# Patient Record
Sex: MALE | Race: WHITE
[De-identification: names, ages, dates, MRNs, and addresses within clinical notes are randomized per-mention and may not be internally consistent; named-entity substitution may affect disease eponyms.]

---

## 2017-04-04 ENCOUNTER — HOSPITAL ENCOUNTER (EMERGENCY)
Dept: HOSPITAL 53 - M ED | Age: 32
Discharge: HOME | End: 2017-04-04
Payer: COMMERCIAL

## 2017-04-04 VITALS — WEIGHT: 250 LBS | BODY MASS INDEX: 33.13 KG/M2 | HEIGHT: 73 IN

## 2017-04-04 VITALS — DIASTOLIC BLOOD PRESSURE: 79 MMHG | SYSTOLIC BLOOD PRESSURE: 140 MMHG
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
## 2017-04-04 DIAGNOSIS — F17.210: ICD-10-CM
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
## 2017-04-04 DIAGNOSIS — S80.861A: Primary | ICD-10-CM

## 2017-06-21 ENCOUNTER — HOSPITAL ENCOUNTER (EMERGENCY)
Dept: HOSPITAL 53 - M ED | Age: 32
Discharge: HOME | End: 2017-06-21
Payer: COMMERCIAL

## 2017-06-21 VITALS — HEIGHT: 72 IN | WEIGHT: 299.83 LBS | BODY MASS INDEX: 40.61 KG/M2

## 2017-06-21 VITALS — DIASTOLIC BLOOD PRESSURE: 70 MMHG | SYSTOLIC BLOOD PRESSURE: 132 MMHG
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
## 2017-06-21 DIAGNOSIS — S39.012A: Primary | ICD-10-CM
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
## 2017-06-21 NOTE — REP
LUMBOSACRAL SPINE:

 

Five views of the lumbosacral spine performed.

 

There is no fracture or dislocation. Vertebral bodies are normal in height and

well aligned with normal lumbar lordosis. There is no spondylolysis or

spondylolisthesis. Disc spaces are well preserved. Posterior elements are intact.

 

 

IMPRESSION:

 

Negative lumbosacral spine series.

 

 

Signed by

Gabe Cabrera MD 06/22/2017 05:11 P

## 2017-07-26 ENCOUNTER — HOSPITAL ENCOUNTER (OUTPATIENT)
Dept: HOSPITAL 53 - M OUTALCOH | Age: 32
End: 2017-07-26
Attending: PSYCHIATRY & NEUROLOGY
Payer: MEDICAID

## 2017-07-26 DIAGNOSIS — Z03.89: Primary | ICD-10-CM

## 2017-08-01 ENCOUNTER — HOSPITAL ENCOUNTER (OUTPATIENT)
Dept: HOSPITAL 53 - M RAD | Age: 32
End: 2017-08-01
Attending: FAMILY MEDICINE
Payer: COMMERCIAL

## 2017-08-01 DIAGNOSIS — M51.26: Primary | ICD-10-CM

## 2017-08-01 DIAGNOSIS — M51.27: ICD-10-CM

## 2017-08-02 NOTE — REP
MRI LUMBAR SPINE WITHOUT CONTRAST:

 

HISTORY:  Back pain.

 

Decreased signal intensity on T2 weighted images is present in the L3-4 through

L5-S1 intervertebral discs.  The discs are decreased in height.  These findings

are consistent with disc degeneration.

 

There is no disc bulge or herniation at the L1-2 and L2-3 levels.  The nerves

exit the neural foramina without compression.

 

A diffuse disc bulge is present at the L3-4 level.  There is minimal compression

of the thecal sac.  The L3 nerves exit the neural foramina without compression.

 

 

A diffuse disc bugle and small central disc protrusion are present at the L4-5

level.  There is minimal compression of the thecal sac.  The L4 nerves exit the

neural foramina without compression.

 

A diffuse disc bulge and small disc protrusion central and eccentric to the left

are present at the L5-S1 level.  The disc protrusion abuts the left S1 nerve.

There is no thecal sac compression.  The L5 nerves exit the neural foramina

without compression.

 

The conus medullaris is normal in appearance terminating at the level of the

T12-L1 intervertebral disc.  Normal signal intensity is present in the lumbar

vertebral bodies.  A hemangioma is present in the S1 vertebral body.

 

IMPRESSION:

 

1.  Diffuse disc bugle and the L3-4 level with minimal thecal sac compression.

 

2.  Diffuse disc bugle and small central disc protrusion at the L4-5 level with

minimal thecal sac compression.

 

3.  Diffuse disc bugle and small disc protrusion at the L5-S1 level.  The disc

protrusion abuts the left S1 nerve.

 

 

Signed by

Chapo Murray MD 08/02/2017 08:44 A

## 2017-09-12 ENCOUNTER — HOSPITAL ENCOUNTER (EMERGENCY)
Dept: HOSPITAL 53 - M ED | Age: 32
Discharge: HOME | End: 2017-09-12
Payer: COMMERCIAL

## 2017-09-12 VITALS — WEIGHT: 302.92 LBS | BODY MASS INDEX: 41.03 KG/M2 | HEIGHT: 72 IN

## 2017-09-12 VITALS — SYSTOLIC BLOOD PRESSURE: 137 MMHG | DIASTOLIC BLOOD PRESSURE: 77 MMHG

## 2017-09-12 DIAGNOSIS — F17.200: ICD-10-CM

## 2017-09-12 DIAGNOSIS — Z79.899: ICD-10-CM

## 2017-09-12 DIAGNOSIS — M54.5: Primary | ICD-10-CM

## 2017-09-12 DIAGNOSIS — G89.29: ICD-10-CM

## 2017-09-12 PROCEDURE — 99282 EMERGENCY DEPT VISIT SF MDM: CPT

## 2017-09-12 PROCEDURE — 96372 THER/PROPH/DIAG INJ SC/IM: CPT

## 2017-09-18 ENCOUNTER — HOSPITAL ENCOUNTER (OUTPATIENT)
Dept: HOSPITAL 53 - M PT | Age: 32
LOS: 12 days | End: 2017-09-30
Attending: STUDENT IN AN ORGANIZED HEALTH CARE EDUCATION/TRAINING PROGRAM
Payer: COMMERCIAL

## 2017-09-18 DIAGNOSIS — G89.29: ICD-10-CM

## 2017-09-18 DIAGNOSIS — M54.40: ICD-10-CM

## 2017-09-18 DIAGNOSIS — Z51.89: Primary | ICD-10-CM

## 2017-10-06 ENCOUNTER — HOSPITAL ENCOUNTER (OUTPATIENT)
Dept: HOSPITAL 53 - M PT | Age: 32
LOS: 25 days | End: 2017-10-31
Attending: STUDENT IN AN ORGANIZED HEALTH CARE EDUCATION/TRAINING PROGRAM
Payer: COMMERCIAL

## 2017-10-06 DIAGNOSIS — M54.40: ICD-10-CM

## 2017-10-06 DIAGNOSIS — Z51.89: Primary | ICD-10-CM

## 2017-12-05 ENCOUNTER — HOSPITAL ENCOUNTER (OUTPATIENT)
Dept: HOSPITAL 53 - M SFHCPLAZ | Age: 32
End: 2017-12-05
Attending: FAMILY MEDICINE
Payer: COMMERCIAL

## 2017-12-05 DIAGNOSIS — Z83.49: ICD-10-CM

## 2017-12-05 DIAGNOSIS — E66.01: Primary | ICD-10-CM

## 2017-12-05 LAB
ALBUMIN SERPL BCG-MCNC: 4 GM/DL (ref 3.2–5.2)
ALBUMIN/GLOB SERPL: 1.14 {RATIO} (ref 1–1.93)
ALP SERPL-CCNC: 81 U/L (ref 45–117)
ALT SERPL W P-5'-P-CCNC: 28 U/L (ref 12–78)
ANION GAP SERPL CALC-SCNC: 6 MEQ/L (ref 8–16)
AST SERPL-CCNC: 14 U/L (ref 7–37)
BILIRUB SERPL-MCNC: 0.4 MG/DL (ref 0.2–1)
BUN SERPL-MCNC: 14 MG/DL (ref 7–18)
CALCIUM SERPL-MCNC: 9 MG/DL (ref 8.5–10.1)
CHLORIDE SERPL-SCNC: 105 MEQ/L (ref 98–107)
CHOLEST SERPL-MCNC: 182 MG/DL (ref ?–200)
CO2 SERPL-SCNC: 30 MEQ/L (ref 21–32)
CREAT SERPL-MCNC: 0.97 MG/DL (ref 0.7–1.3)
EST. AVERAGE GLUCOSE BLD GHB EST-MCNC: 105 MG/DL (ref 60–110)
GFR SERPL CREATININE-BSD FRML MDRD: > 60 ML/MIN/{1.73_M2} (ref 60–?)
GLUCOSE SERPL-MCNC: 80 MG/DL (ref 70–105)
POTASSIUM SERPL-SCNC: 4.8 MEQ/L (ref 3.5–5.1)
PROT SERPL-MCNC: 7.5 GM/DL (ref 6.4–8.2)
SODIUM SERPL-SCNC: 141 MEQ/L (ref 136–145)
TRIGL SERPL-MCNC: 74 MG/DL (ref ?–150)

## 2018-01-29 ENCOUNTER — HOSPITAL ENCOUNTER (EMERGENCY)
Dept: HOSPITAL 53 - M ED | Age: 33
Discharge: HOME | End: 2018-01-29
Payer: MEDICAID

## 2018-01-29 DIAGNOSIS — M54.32: ICD-10-CM
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
## 2018-01-29 DIAGNOSIS — Z79.899: ICD-10-CM
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
## 2018-01-29 DIAGNOSIS — G89.29: ICD-10-CM

## 2018-01-29 DIAGNOSIS — S39.012A: Primary | ICD-10-CM

## 2018-01-29 PROCEDURE — 99283 EMERGENCY DEPT VISIT LOW MDM: CPT

## 2018-04-29 ENCOUNTER — HOSPITAL ENCOUNTER (OUTPATIENT)
Dept: HOSPITAL 53 - M LAB | Age: 33
End: 2018-04-29
Attending: FAMILY MEDICINE
Payer: COMMERCIAL

## 2018-04-29 ENCOUNTER — HOSPITAL ENCOUNTER (OUTPATIENT)
Dept: HOSPITAL 53 - M RAD | Age: 33
End: 2018-04-29
Attending: NURSE PRACTITIONER
Payer: COMMERCIAL

## 2018-04-29 DIAGNOSIS — S69.91XD: Primary | ICD-10-CM

## 2018-04-29 DIAGNOSIS — R61: Primary | ICD-10-CM
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
## 2018-04-29 LAB
BASO #: 0.1 10^3/UL (ref 0–0.2)
BASO %: 0.9 % (ref 0–1)
EOS #: 0.7 10^3/UL (ref 0–0.5)
EOSINOPHIL NFR BLD AUTO: 9.2 % (ref 0–3)
FREE T4: 1.13 NG/DL (ref 0.76–1.46)
HEMATOCRIT: 43.8 % (ref 42–52)
HEMOGLOBIN: 15.3 G/DL (ref 13.5–17.5)
IMMATURE GRANULOCYTE %: 0.7 % (ref 0–3)
LYMPH #: 2 10^3/UL (ref 1.5–4.5)
LYMPH %: 26.5 % (ref 24–44)
MEAN CORPUSCULAR HEMOGLOBIN: 30.5 PG (ref 27–33)
MEAN CORPUSCULAR HGB CONC: 34.9 G/DL (ref 32–36.5)
MEAN CORPUSCULAR VOLUME: 87.4 FL (ref 80–96)
MONO #: 0.7 10^3/UL (ref 0–0.8)
MONO %: 8.5 % (ref 0–5)
NEUTROPHILS #: 4.1 10^3/UL (ref 1.8–7.7)
NEUTROPHILS %: 54.2 % (ref 36–66)
NRBC BLD AUTO-RTO: 0 % (ref 0–0)
PLATELET COUNT, AUTOMATED: 311 10^3/UL (ref 150–450)
RED BLOOD COUNT: 5.01 10^6/UL (ref 4.3–6.1)
RED CELL DISTRIBUTION WIDTH: 12 % (ref 11.5–14.5)
THYROID STIMULATING HORMONE: 1.97 UIU/ML (ref 0.36–3.74)
WHITE BLOOD COUNT: 7.6 10^3/UL (ref 4–10)

## 2018-04-29 PROCEDURE — 84443 ASSAY THYROID STIM HORMONE: CPT

## 2018-04-29 PROCEDURE — 73130 X-RAY EXAM OF HAND: CPT

## 2018-04-30 LAB — HIV 1&2 SCREEN CENTAUR: NEGATIVE

## 2018-08-09 ENCOUNTER — HOSPITAL ENCOUNTER (OUTPATIENT)
Dept: HOSPITAL 53 - M PAIN | Age: 33
End: 2018-08-09
Attending: NURSE PRACTITIONER
Payer: COMMERCIAL

## 2018-08-09 DIAGNOSIS — M54.17: ICD-10-CM

## 2018-08-09 DIAGNOSIS — F17.220: ICD-10-CM

## 2018-08-09 DIAGNOSIS — Z79.899: ICD-10-CM

## 2018-08-09 DIAGNOSIS — M51.26: Primary | ICD-10-CM

## 2018-09-10 ENCOUNTER — HOSPITAL ENCOUNTER (EMERGENCY)
Dept: HOSPITAL 53 - M ED | Age: 33
Discharge: HOME | End: 2018-09-10
Payer: COMMERCIAL

## 2018-09-10 DIAGNOSIS — M51.37: Primary | ICD-10-CM

## 2018-09-10 DIAGNOSIS — F17.220: ICD-10-CM

## 2018-09-10 RX ADMIN — KETOROLAC TROMETHAMINE 1 MG: 30 INJECTION, SOLUTION INTRAMUSCULAR at 21:12

## 2018-09-13 ENCOUNTER — HOSPITAL ENCOUNTER (OUTPATIENT)
Dept: HOSPITAL 53 - M PAIN | Age: 33
End: 2018-09-13
Attending: NURSE PRACTITIONER
Payer: COMMERCIAL

## 2018-09-13 DIAGNOSIS — F17.220: ICD-10-CM

## 2018-09-13 DIAGNOSIS — E66.01: ICD-10-CM

## 2018-09-13 DIAGNOSIS — M54.17: ICD-10-CM

## 2018-09-13 DIAGNOSIS — Z79.899: ICD-10-CM

## 2018-09-13 DIAGNOSIS — M51.26: Primary | ICD-10-CM

## 2018-10-04 ENCOUNTER — HOSPITAL ENCOUNTER (OUTPATIENT)
Dept: HOSPITAL 53 - M PAIN | Age: 33
End: 2018-10-04
Attending: ANESTHESIOLOGY
Payer: COMMERCIAL

## 2018-10-04 DIAGNOSIS — F17.220: ICD-10-CM

## 2018-10-04 DIAGNOSIS — M51.16: Primary | ICD-10-CM

## 2018-10-04 DIAGNOSIS — Z79.899: ICD-10-CM

## 2018-10-04 DIAGNOSIS — Z79.891: ICD-10-CM

## 2018-10-25 ENCOUNTER — HOSPITAL ENCOUNTER (EMERGENCY)
Dept: HOSPITAL 53 - M ED | Age: 33
Discharge: HOME | End: 2018-10-25
Payer: COMMERCIAL

## 2018-10-25 DIAGNOSIS — S60.221A: Primary | ICD-10-CM
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
## 2018-10-25 DIAGNOSIS — E11.9: ICD-10-CM
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
## 2018-10-25 PROCEDURE — 73130 X-RAY EXAM OF HAND: CPT

## 2018-11-02 ENCOUNTER — HOSPITAL ENCOUNTER (EMERGENCY)
Dept: HOSPITAL 53 - M ED | Age: 33
Discharge: HOME | End: 2018-11-02
Payer: COMMERCIAL

## 2018-11-02 DIAGNOSIS — G43.909: ICD-10-CM

## 2018-11-02 DIAGNOSIS — R50.9: ICD-10-CM

## 2018-11-02 DIAGNOSIS — Z72.0: ICD-10-CM

## 2018-11-02 DIAGNOSIS — R11.2: Primary | ICD-10-CM

## 2018-11-02 LAB
FLUAV RNA UPPER RESP QL NAA+PROBE: NEGATIVE
INFLUENZA B AMPLIFICATION: NEGATIVE
RSV AMPLIFICATION: NEGATIVE

## 2018-11-02 RX ADMIN — ACETAMINOPHEN 1 MG: 325 TABLET ORAL at 16:30

## 2018-11-02 RX ADMIN — ONDANSETRON 1 MG: 4 TABLET, ORALLY DISINTEGRATING ORAL at 16:30

## 2018-11-02 RX ADMIN — IBUPROFEN 1 MG: 800 TABLET, FILM COATED ORAL at 16:30

## 2018-11-22 ENCOUNTER — HOSPITAL ENCOUNTER (EMERGENCY)
Dept: HOSPITAL 53 - M ED | Age: 33
Discharge: HOME | End: 2018-11-22
Payer: COMMERCIAL

## 2018-11-22 DIAGNOSIS — A26.0: Primary | ICD-10-CM

## 2018-11-22 PROCEDURE — 86617 LYME DISEASE ANTIBODY: CPT

## 2018-11-22 RX ADMIN — DOXYCYCLINE HYCLATE 1 MG: 100 TABLET, COATED ORAL at 11:41

## 2018-11-27 LAB
B BURGDOR IGG+IGM SER-ACNC: 1.57 ISR (ref 0–0.9)
B BURGDOR IGM SER IA-ACNC: 6.58 INDEX (ref 0–0.79)

## 2019-01-26 ENCOUNTER — HOSPITAL ENCOUNTER (EMERGENCY)
Dept: HOSPITAL 53 - M ED | Age: 34
Discharge: HOME | End: 2019-01-26
Payer: COMMERCIAL

## 2019-01-26 VITALS — BODY MASS INDEX: 40.72 KG/M2 | HEIGHT: 72 IN | WEIGHT: 300.62 LBS

## 2019-01-26 VITALS — SYSTOLIC BLOOD PRESSURE: 143 MMHG | DIASTOLIC BLOOD PRESSURE: 97 MMHG

## 2019-01-26 DIAGNOSIS — R73.03: ICD-10-CM

## 2019-01-26 DIAGNOSIS — K29.70: Primary | ICD-10-CM

## 2019-01-26 DIAGNOSIS — R10.13: ICD-10-CM

## 2019-01-26 LAB
ALBUMIN SERPL BCG-MCNC: 3.8 GM/DL (ref 3.2–5.2)
ALT SERPL W P-5'-P-CCNC: 31 U/L (ref 12–78)
BASOPHILS # BLD AUTO: 0.1 10^3/UL (ref 0–0.2)
BASOPHILS NFR BLD AUTO: 1.1 % (ref 0–1)
BILIRUB CONJ SERPL-MCNC: 0.1 MG/DL (ref 0–0.2)
BILIRUB SERPL-MCNC: 0.3 MG/DL (ref 0.2–1)
BUN SERPL-MCNC: 12 MG/DL (ref 7–18)
CALCIUM SERPL-MCNC: 8.4 MG/DL (ref 8.5–10.1)
CHLORIDE SERPL-SCNC: 107 MEQ/L (ref 98–107)
CO2 SERPL-SCNC: 26 MEQ/L (ref 21–32)
CREAT SERPL-MCNC: 0.96 MG/DL (ref 0.7–1.3)
EOSINOPHIL # BLD AUTO: 0.6 10^3/UL (ref 0–0.5)
EOSINOPHIL NFR BLD AUTO: 8.3 % (ref 0–3)
GFR SERPL CREATININE-BSD FRML MDRD: > 60 ML/MIN/{1.73_M2} (ref 60–?)
GLUCOSE SERPL-MCNC: 101 MG/DL (ref 70–100)
HCT VFR BLD AUTO: 39.2 % (ref 42–52)
HGB BLD-MCNC: 13.3 G/DL (ref 13.5–17.5)
LIPASE SERPL-CCNC: 126 U/L (ref 73–393)
LYMPHOCYTES # BLD AUTO: 2.4 10^3/UL (ref 1.5–4.5)
LYMPHOCYTES NFR BLD AUTO: 33.7 % (ref 24–44)
MCH RBC QN AUTO: 30.3 PG (ref 27–33)
MCHC RBC AUTO-ENTMCNC: 33.9 G/DL (ref 32–36.5)
MCV RBC AUTO: 89.3 FL (ref 80–96)
MONOCYTES # BLD AUTO: 0.6 10^3/UL (ref 0–0.8)
MONOCYTES NFR BLD AUTO: 8.5 % (ref 0–5)
NEUTROPHILS # BLD AUTO: 3.5 10^3/UL (ref 1.8–7.7)
NEUTROPHILS NFR BLD AUTO: 48 % (ref 36–66)
PLATELET # BLD AUTO: 250 10^3/UL (ref 150–450)
POTASSIUM SERPL-SCNC: 4.2 MEQ/L (ref 3.5–5.1)
PROT SERPL-MCNC: 7.2 GM/DL (ref 6.4–8.2)
RBC # BLD AUTO: 4.39 10^6/UL (ref 4.3–6.1)
SODIUM SERPL-SCNC: 141 MEQ/L (ref 136–145)
WBC # BLD AUTO: 7.2 10^3/UL (ref 4–10)

## 2019-01-26 NOTE — REP
Clinical:  Epigastric and abdominal pain.

 

Technique:  Upright view of the chest with supine and upright views of the

abdomen and pelvis.

 

Findings:  Frontal upright view of the chest demonstrates no acute

cardiopulmonary process or free air below the diaphragm to suspect

pneumoperitoneum.  Supine and upright views of the abdomen and pelvis demonstrate

nonspecific bowel gas pattern without obstruction or perforation.  No

organomegaly.  No abnormal calcifications.  Skeletal structures normal for age.

 

Impression:

Nonspecific bowel gas pattern.

 

 

Electronically Signed by

Juwan Peterson MD 01/26/2019 08:09 A

## 2019-07-31 ENCOUNTER — HOSPITAL ENCOUNTER (EMERGENCY)
Dept: HOSPITAL 53 - M ED | Age: 34
Discharge: HOME | End: 2019-07-31
Payer: COMMERCIAL

## 2019-07-31 VITALS — WEIGHT: 280.58 LBS | BODY MASS INDEX: 38 KG/M2 | HEIGHT: 72 IN

## 2019-07-31 VITALS — DIASTOLIC BLOOD PRESSURE: 107 MMHG | SYSTOLIC BLOOD PRESSURE: 142 MMHG

## 2019-07-31 DIAGNOSIS — M77.32: ICD-10-CM

## 2019-07-31 DIAGNOSIS — M54.9: ICD-10-CM

## 2019-07-31 DIAGNOSIS — M72.2: Primary | ICD-10-CM

## 2019-07-31 DIAGNOSIS — G89.29: ICD-10-CM

## 2019-07-31 DIAGNOSIS — M79.673: ICD-10-CM

## 2019-07-31 DIAGNOSIS — M77.31: ICD-10-CM

## 2019-07-31 PROCEDURE — 96372 THER/PROPH/DIAG INJ SC/IM: CPT

## 2019-07-31 PROCEDURE — 99283 EMERGENCY DEPT VISIT LOW MDM: CPT

## 2019-07-31 PROCEDURE — 73620 X-RAY EXAM OF FOOT: CPT

## 2019-07-31 NOTE — REP
Clinical:  Pain.  Plantar fasciitis.

 

Technique:  AP, lateral views of the right and left foot.

 

Findings:

Lateral views demonstrate small bilateral calcaneal heel spurs (left greater than

right).  No abnormal soft tissue calcifications are identified bilaterally.  The

osseous structures and joint spaces are otherwise symmetric and normal.

 

Impression:

Small heel spurs noted (left greater than right).

 

 

Electronically Signed by

Juwan Peterson MD 07/31/2019 07:39 A

## 2020-01-07 ENCOUNTER — HOSPITAL ENCOUNTER (EMERGENCY)
Dept: HOSPITAL 53 - M ED | Age: 35
LOS: 1 days | Discharge: HOME | End: 2020-01-08
Payer: SELF-PAY

## 2020-01-07 VITALS — HEIGHT: 72 IN | WEIGHT: 263.23 LBS | BODY MASS INDEX: 35.65 KG/M2

## 2020-01-07 VITALS — DIASTOLIC BLOOD PRESSURE: 71 MMHG | SYSTOLIC BLOOD PRESSURE: 126 MMHG

## 2020-01-07 DIAGNOSIS — M54.16: ICD-10-CM

## 2020-01-07 DIAGNOSIS — G43.909: ICD-10-CM

## 2020-01-07 DIAGNOSIS — G89.29: ICD-10-CM

## 2020-01-07 DIAGNOSIS — M51.9: ICD-10-CM

## 2020-01-07 DIAGNOSIS — Z79.899: ICD-10-CM

## 2020-01-07 DIAGNOSIS — M72.2: Primary | ICD-10-CM

## 2020-01-07 DIAGNOSIS — M54.9: ICD-10-CM

## 2020-01-07 PROCEDURE — 83735 ASSAY OF MAGNESIUM: CPT

## 2020-01-07 PROCEDURE — 80047 BASIC METABLC PNL IONIZED CA: CPT

## 2020-01-07 PROCEDURE — 85025 COMPLETE CBC W/AUTO DIFF WBC: CPT

## 2020-01-07 PROCEDURE — 96372 THER/PROPH/DIAG INJ SC/IM: CPT

## 2020-01-07 PROCEDURE — 85652 RBC SED RATE AUTOMATED: CPT

## 2020-01-07 PROCEDURE — 84550 ASSAY OF BLOOD/URIC ACID: CPT

## 2020-01-07 PROCEDURE — 86140 C-REACTIVE PROTEIN: CPT

## 2020-01-07 PROCEDURE — 99283 EMERGENCY DEPT VISIT LOW MDM: CPT

## 2020-01-08 LAB
BASOPHILS # BLD AUTO: 0.1 10^3/UL (ref 0–0.2)
BASOPHILS NFR BLD AUTO: 0.7 % (ref 0–1)
CRP SERPL-MCNC: < 0.3 MG/DL (ref 0–0.3)
EOSINOPHIL # BLD AUTO: 0.4 10^3/UL (ref 0–0.5)
EOSINOPHIL NFR BLD AUTO: 4.1 % (ref 0–3)
ERYTHROCYTE [SEDIMENTATION RATE] IN BLOOD BY WESTERGREN METHOD: 7 MM/HR (ref 0–15)
HCT VFR BLD AUTO: 44.4 % (ref 42–52)
HGB BLD-MCNC: 14.6 G/DL (ref 13.5–17.5)
LYMPHOCYTES # BLD AUTO: 3 10^3/UL (ref 1.5–5)
LYMPHOCYTES NFR BLD AUTO: 34.1 % (ref 24–44)
MAGNESIUM SERPL-MCNC: 2.3 MG/DL (ref 1.8–2.4)
MCH RBC QN AUTO: 30.1 PG (ref 27–33)
MCHC RBC AUTO-ENTMCNC: 32.9 G/DL (ref 32–36.5)
MCV RBC AUTO: 91.5 FL (ref 80–96)
MONOCYTES # BLD AUTO: 0.9 10^3/UL (ref 0–0.8)
MONOCYTES NFR BLD AUTO: 10 % (ref 0–5)
NEUTROPHILS # BLD AUTO: 4.4 10^3/UL (ref 1.5–8.5)
NEUTROPHILS NFR BLD AUTO: 50.2 % (ref 36–66)
PLATELET # BLD AUTO: 297 10^3/UL (ref 150–450)
RBC # BLD AUTO: 4.85 10^6/UL (ref 4.3–6.1)
URATE SERPL-MCNC: 5.9 MG/DL (ref 3.5–7.2)
WBC # BLD AUTO: 8.8 10^3/UL (ref 4–10)

## 2022-07-31 ENCOUNTER — HOSPITAL ENCOUNTER (INPATIENT)
Dept: HOSPITAL 53 - M ED | Age: 37
LOS: 4 days | Discharge: HOME | DRG: 754 | End: 2022-08-04
Attending: STUDENT IN AN ORGANIZED HEALTH CARE EDUCATION/TRAINING PROGRAM | Admitting: STUDENT IN AN ORGANIZED HEALTH CARE EDUCATION/TRAINING PROGRAM
Payer: COMMERCIAL

## 2022-07-31 VITALS — BODY MASS INDEX: 37.47 KG/M2 | WEIGHT: 276.68 LBS | HEIGHT: 72 IN

## 2022-07-31 DIAGNOSIS — F32.A: Primary | ICD-10-CM

## 2022-07-31 DIAGNOSIS — F32.9: ICD-10-CM

## 2022-07-31 DIAGNOSIS — F12.90: ICD-10-CM

## 2022-07-31 DIAGNOSIS — F43.23: ICD-10-CM

## 2022-07-31 DIAGNOSIS — F17.220: ICD-10-CM

## 2022-07-31 DIAGNOSIS — F10.10: ICD-10-CM

## 2022-07-31 DIAGNOSIS — F41.1: ICD-10-CM

## 2022-07-31 DIAGNOSIS — R45.851: ICD-10-CM

## 2022-07-31 LAB
ALBUMIN SERPL BCG-MCNC: 4.2 GM/DL (ref 3.2–5.2)
ALT SERPL W P-5'-P-CCNC: 51 U/L (ref 12–78)
AMPHETAMINES UR QL SCN: NEGATIVE
APAP SERPL-MCNC: < 2 UG/ML (ref 10–30)
BARBITURATES UR QL SCN: NEGATIVE
BENZODIAZ UR QL SCN: NEGATIVE
BILIRUB CONJ SERPL-MCNC: < 0.1 MG/DL (ref 0–0.2)
BILIRUB SERPL-MCNC: 0.3 MG/DL (ref 0.2–1)
BUN SERPL-MCNC: 11 MG/DL (ref 7–18)
BZE UR QL SCN: NEGATIVE
CALCIUM SERPL-MCNC: 9.2 MG/DL (ref 8.5–10.1)
CANNABINOIDS UR QL SCN: POSITIVE
CHLORIDE SERPL-SCNC: 111 MEQ/L (ref 98–107)
CO2 SERPL-SCNC: 22 MEQ/L (ref 21–32)
CREAT SERPL-MCNC: 0.98 MG/DL (ref 0.7–1.3)
ETHANOL SERPL-MCNC: 0.21 % (ref 0–0.01)
GFR SERPL CREATININE-BSD FRML MDRD: > 60 ML/MIN/{1.73_M2} (ref 60–?)
GLUCOSE SERPL-MCNC: 120 MG/DL (ref 70–100)
HCT VFR BLD AUTO: 46.6 % (ref 42–52)
HGB BLD-MCNC: 16 G/DL (ref 13.5–17.5)
MCH RBC QN AUTO: 30.9 PG (ref 27–33)
MCHC RBC AUTO-ENTMCNC: 34.3 G/DL (ref 32–36.5)
MCV RBC AUTO: 90.1 FL (ref 80–96)
METHADONE UR QL SCN: NEGATIVE
OPIATES UR QL SCN: NEGATIVE
PCP UR QL SCN: NEGATIVE
PLATELET # BLD AUTO: 352 10^3/UL (ref 150–450)
POTASSIUM SERPL-SCNC: 3.8 MEQ/L (ref 3.5–5.1)
PROT SERPL-MCNC: 8 GM/DL (ref 6.4–8.2)
RBC # BLD AUTO: 5.17 10^6/UL (ref 4.3–6.1)
RSV RNA NPH QL NAA+PROBE: NEGATIVE
SALICYLATES SERPL-MCNC: < 1.7 MG/DL (ref 5–30)
SODIUM SERPL-SCNC: 144 MEQ/L (ref 136–145)
TSH SERPL DL<=0.005 MIU/L-ACNC: 4.72 UIU/ML (ref 0.36–3.74)
WBC # BLD AUTO: 11.8 10^3/UL (ref 4–10)

## 2022-08-01 VITALS — DIASTOLIC BLOOD PRESSURE: 105 MMHG | SYSTOLIC BLOOD PRESSURE: 153 MMHG

## 2022-08-01 VITALS — SYSTOLIC BLOOD PRESSURE: 142 MMHG | DIASTOLIC BLOOD PRESSURE: 90 MMHG

## 2022-08-01 RX ADMIN — FOLIC ACID SCH MG: 1 TABLET ORAL at 09:00

## 2022-08-01 RX ADMIN — Medication SCH MG: at 14:29

## 2022-08-01 RX ADMIN — Medication SCH MG: at 14:32

## 2022-08-01 RX ADMIN — NICOTINE SCH PATCH: 21 PATCH, EXTENDED RELEASE TRANSDERMAL at 09:00

## 2022-08-01 RX ADMIN — MULTIPLE VITAMINS W/ MINERALS TAB SCH TAB: TAB at 09:00

## 2022-08-02 VITALS — DIASTOLIC BLOOD PRESSURE: 76 MMHG | SYSTOLIC BLOOD PRESSURE: 134 MMHG

## 2022-08-02 VITALS — SYSTOLIC BLOOD PRESSURE: 128 MMHG | DIASTOLIC BLOOD PRESSURE: 88 MMHG

## 2022-08-02 VITALS — DIASTOLIC BLOOD PRESSURE: 97 MMHG | SYSTOLIC BLOOD PRESSURE: 154 MMHG

## 2022-08-02 RX ADMIN — Medication SCH MG: at 09:13

## 2022-08-02 RX ADMIN — MULTIPLE VITAMINS W/ MINERALS TAB SCH TAB: TAB at 09:13

## 2022-08-02 RX ADMIN — NICOTINE SCH PATCH: 21 PATCH, EXTENDED RELEASE TRANSDERMAL at 09:14

## 2022-08-02 RX ADMIN — FOLIC ACID SCH MG: 1 TABLET ORAL at 09:13

## 2022-08-03 VITALS — DIASTOLIC BLOOD PRESSURE: 92 MMHG | SYSTOLIC BLOOD PRESSURE: 149 MMHG

## 2022-08-03 VITALS — SYSTOLIC BLOOD PRESSURE: 146 MMHG | DIASTOLIC BLOOD PRESSURE: 82 MMHG

## 2022-08-03 RX ADMIN — NICOTINE SCH PATCH: 21 PATCH, EXTENDED RELEASE TRANSDERMAL at 08:36

## 2022-08-03 RX ADMIN — MULTIPLE VITAMINS W/ MINERALS TAB SCH TAB: TAB at 08:35

## 2022-08-03 RX ADMIN — SERTRALINE HYDROCHLORIDE SCH MG: 50 TABLET ORAL at 09:45

## 2022-08-03 RX ADMIN — FOLIC ACID SCH MG: 1 TABLET ORAL at 08:35

## 2022-08-04 VITALS — SYSTOLIC BLOOD PRESSURE: 147 MMHG | DIASTOLIC BLOOD PRESSURE: 88 MMHG

## 2022-08-04 RX ADMIN — NICOTINE SCH PATCH: 21 PATCH, EXTENDED RELEASE TRANSDERMAL at 08:30

## 2022-08-04 RX ADMIN — SERTRALINE HYDROCHLORIDE SCH MG: 50 TABLET ORAL at 08:30

## 2022-08-04 RX ADMIN — FOLIC ACID SCH MG: 1 TABLET ORAL at 08:31

## 2022-08-04 RX ADMIN — MULTIPLE VITAMINS W/ MINERALS TAB SCH TAB: TAB at 08:30
